# Patient Record
Sex: FEMALE | Race: WHITE | NOT HISPANIC OR LATINO | Employment: UNEMPLOYED | URBAN - METROPOLITAN AREA
[De-identification: names, ages, dates, MRNs, and addresses within clinical notes are randomized per-mention and may not be internally consistent; named-entity substitution may affect disease eponyms.]

---

## 2020-08-18 ENCOUNTER — TELEPHONE (OUTPATIENT)
Dept: OPHTHALMOLOGY | Facility: CLINIC | Age: 65
End: 2020-08-18

## 2020-08-18 NOTE — TELEPHONE ENCOUNTER
Spoke with patient and made her aware that I can call back once Dr. Guzmán's books open to schedule her to be seen.    -TD           ----- Message from Willian Maldonado sent at 8/18/2020 10:41 AM CDT -----  Contact: pt  Calling to speak with Dr or nurse regarding appt and referral     Call back: [phone]